# Patient Record
Sex: FEMALE | Race: WHITE
[De-identification: names, ages, dates, MRNs, and addresses within clinical notes are randomized per-mention and may not be internally consistent; named-entity substitution may affect disease eponyms.]

---

## 2018-03-16 ENCOUNTER — HOSPITAL ENCOUNTER (OUTPATIENT)
Dept: HOSPITAL 76 - DI.S | Age: 68
Discharge: HOME | End: 2018-03-16
Attending: INTERNAL MEDICINE
Payer: MEDICARE

## 2018-03-16 DIAGNOSIS — Z80.3: ICD-10-CM

## 2018-03-16 DIAGNOSIS — Z12.31: Primary | ICD-10-CM

## 2018-03-16 PROCEDURE — 77067 SCR MAMMO BI INCL CAD: CPT

## 2018-03-19 NOTE — MAMMOGRAPHY REPORT
DIGITAL SCREENING MAMMOGRAM:  03/16/2018

 

CLINICAL INDICATION: A 67-year-old with family history of breast cancer, for screening.

 

COMPARISON: 12/2014, 03/2013, 03/2011.

 

TECHNIQUE:  Routine CC and MLO projections were obtained of the breasts.

 

FINDINGS:  Parenchymal tissue within both breasts is heterogeneously dense, which may lower the

sensitivity of mammography; however, there are no dominant masses, suspicious 

microcalcifications, or secondary signs of malignancy.  In comparison to the previous studies, 

there are no significant changes.

 

ASSESSMENT:  NO MAMMOGRAPHIC EVIDENCE OF MALIGNANCY.  NO SIGNIFICANT INTERVAL CHANGES.

 

RECOMMENDATION:  Screening mammography is recommended annually.

 

BIRADS category 1 - negative.

 

STANDARD QUALIFYING STATEMENTS:

1.  This examination was reviewed with the aid of Computed-Aided Detection (CAD).

2.  A negative or benign imaging report should not delay biopsy if clinically suspicious 

findings are present.  Consider surgical consultation if warranted.  More than 5% of cancers 

are not identified by imaging.

3.  Dense breasts may obscure an underlying neoplasm.

 

 

DD: 03/19/2018 15:22

TD: 03/19/2018 15:41

Job #: [id number]

## 2019-10-28 ENCOUNTER — HOSPITAL ENCOUNTER (OUTPATIENT)
Dept: HOSPITAL 76 - DI.S | Age: 69
Discharge: HOME | End: 2019-10-28
Attending: INTERNAL MEDICINE
Payer: MEDICARE

## 2019-10-28 DIAGNOSIS — Z12.31: Primary | ICD-10-CM

## 2019-10-28 PROCEDURE — 77067 SCR MAMMO BI INCL CAD: CPT

## 2019-10-28 NOTE — MAMMOGRAPHY REPORT
Reason:  SCREENING MAMMO

Procedure Date:  10/28/2019   

Accession Number:  [id number] / [id number]                    

Procedure:  MGS - Screening Mammo Dig Bilat CPT Code:  

 

FULL RESULT:

 

 

EXAM: Screening Mammo Dig Bilat

 

DATE: 10/28/2019 1:11 PM

 

CLINICAL HISTORY:  Routine screening

 

TECHNIQUE: (B) - Bilateral  CC and MLO views were obtained.

 

COMPARISON: 3/16/2018, 6/1/2017, 12/31/2014, 3/1/2013, 3/21/2011

 

PARENCHYMAL PATTERN: (D) - The breasts demonstrate heterogeneously dense 

fibroglandular parenchyma bilaterally.

 

FINDINGS:

No significant interval change. There are no suspicious masses, 

calcifications, or areas of distortion.

 

IMPRESSION: Negative examination. BI-RADS category 1.

 

RECOMMENDATION: (ANNUAL)  - Recommend routine annual screening 

mammography.

 

BI-RADS CATEGORY: (1) - Negative.

 

STANDARD QUALIFYING STATEMENTS:

1.  This examination was not reviewed with the aid of Computer-Aided 

Detection (CAD).

2.  A negative or benign  imaging report should not preclude biopsy if 

clinically suspicious findings are present.

3.  Dense breasts may obscure an underlying neoplasm.

4. This examination was reviewed without the aid of 3D breast imaging 

(tomosynthesis).

## 2021-04-28 ENCOUNTER — HOSPITAL ENCOUNTER (OUTPATIENT)
Dept: HOSPITAL 76 - DI.S | Age: 71
Discharge: HOME | End: 2021-04-28
Attending: PHYSICIAN ASSISTANT
Payer: MEDICARE

## 2021-04-28 DIAGNOSIS — J44.9: Primary | ICD-10-CM

## 2021-04-28 NOTE — XRAY REPORT
PROCEDURE:  Chest 2 View X-Ray

 

INDICATIONS:  CHRONIC OBSTRUCTIVE LUNG DISEASE

 

TECHNIQUE:  2 view(s) of the chest.  

 

COMPARISON:  9/17/2013.

 

FINDINGS:  

 

Surgical changes and devices:  None.  

 

Lungs and pleura:  No pleural effusions or pneumothorax.  Lungs are clear. Biapical pleural-parenchym
al scarring noted which is stable compared to the prior exam. Lungs are hyperinflated chest and COPD.
 

 

Mediastinum:  Mediastinal contours are normal.  Heart size is normal.  

 

Bones and chest wall:  No suspicious bony abnormalities.  Soft tissues appear unremarkable.  

 

 

IMPRESSION:  

 

 No acute cardiopulmonary disease process.

 

Reviewed by: Ayla Pop MD, PhD on 4/28/2021 5:48 PM PDT

Approved by: Ayla Pop MD, PhD on 4/28/2021 5:48 PM PDT

 

 

Station ID:  [id number]